# Patient Record
Sex: FEMALE | Race: WHITE | NOT HISPANIC OR LATINO | Employment: FULL TIME | URBAN - METROPOLITAN AREA
[De-identification: names, ages, dates, MRNs, and addresses within clinical notes are randomized per-mention and may not be internally consistent; named-entity substitution may affect disease eponyms.]

---

## 2020-10-14 ENCOUNTER — OFFICE VISIT (OUTPATIENT)
Dept: OCCUPATIONAL MEDICINE | Facility: CLINIC | Age: 30
End: 2020-10-14

## 2020-10-14 VITALS
HEIGHT: 61 IN | OXYGEN SATURATION: 98 % | DIASTOLIC BLOOD PRESSURE: 80 MMHG | HEART RATE: 81 BPM | WEIGHT: 204 LBS | BODY MASS INDEX: 38.51 KG/M2 | SYSTOLIC BLOOD PRESSURE: 122 MMHG | RESPIRATION RATE: 14 BRPM | TEMPERATURE: 97.1 F

## 2020-10-14 DIAGNOSIS — Z02.1 PRE-EMPLOYMENT HEALTH SCREENING EXAMINATION: ICD-10-CM

## 2020-10-14 PROCEDURE — 8915 PR COMPREHENSIVE PHYSICAL: Performed by: NURSE PRACTITIONER

## 2021-02-02 ENCOUNTER — HOSPITAL ENCOUNTER (OUTPATIENT)
Dept: RADIOLOGY | Facility: MEDICAL CENTER | Age: 31
End: 2021-02-02
Attending: PHYSICIAN ASSISTANT
Payer: COMMERCIAL

## 2021-02-02 ENCOUNTER — OFFICE VISIT (OUTPATIENT)
Dept: URGENT CARE | Facility: PHYSICIAN GROUP | Age: 31
End: 2021-02-02
Payer: COMMERCIAL

## 2021-02-02 ENCOUNTER — APPOINTMENT (OUTPATIENT)
Dept: RADIOLOGY | Facility: MEDICAL CENTER | Age: 31
End: 2021-02-02
Payer: OTHER GOVERNMENT

## 2021-02-02 ENCOUNTER — HOSPITAL ENCOUNTER (EMERGENCY)
Facility: MEDICAL CENTER | Age: 31
End: 2021-02-02
Attending: EMERGENCY MEDICINE
Payer: OTHER GOVERNMENT

## 2021-02-02 ENCOUNTER — APPOINTMENT (OUTPATIENT)
Dept: RADIOLOGY | Facility: MEDICAL CENTER | Age: 31
End: 2021-02-02
Attending: EMERGENCY MEDICINE
Payer: OTHER GOVERNMENT

## 2021-02-02 VITALS
OXYGEN SATURATION: 97 % | TEMPERATURE: 96.6 F | RESPIRATION RATE: 18 BRPM | WEIGHT: 190 LBS | SYSTOLIC BLOOD PRESSURE: 128 MMHG | BODY MASS INDEX: 35.87 KG/M2 | DIASTOLIC BLOOD PRESSURE: 92 MMHG | HEIGHT: 61 IN | HEART RATE: 78 BPM

## 2021-02-02 VITALS
DIASTOLIC BLOOD PRESSURE: 97 MMHG | WEIGHT: 190 LBS | RESPIRATION RATE: 18 BRPM | TEMPERATURE: 97.2 F | HEIGHT: 61 IN | BODY MASS INDEX: 35.87 KG/M2 | OXYGEN SATURATION: 96 % | SYSTOLIC BLOOD PRESSURE: 135 MMHG | HEART RATE: 72 BPM

## 2021-02-02 DIAGNOSIS — U07.1 COVID-19 VIRUS INFECTION: ICD-10-CM

## 2021-02-02 DIAGNOSIS — J98.01 BRONCHOSPASM, ACUTE: ICD-10-CM

## 2021-02-02 DIAGNOSIS — R06.00 DYSPNEA, UNSPECIFIED TYPE: ICD-10-CM

## 2021-02-02 DIAGNOSIS — R07.9 CHEST PAIN, UNSPECIFIED TYPE: ICD-10-CM

## 2021-02-02 DIAGNOSIS — Z86.16 HISTORY OF COVID-19: ICD-10-CM

## 2021-02-02 LAB
ALBUMIN SERPL BCP-MCNC: 4.1 G/DL (ref 3.2–4.9)
ALBUMIN/GLOB SERPL: 1.4 G/DL
ALP SERPL-CCNC: 87 U/L (ref 30–99)
ALT SERPL-CCNC: 340 U/L (ref 2–50)
ANION GAP SERPL CALC-SCNC: 10 MMOL/L (ref 7–16)
AST SERPL-CCNC: 189 U/L (ref 12–45)
BASOPHILS # BLD AUTO: 1.3 % (ref 0–1.8)
BASOPHILS # BLD: 0.11 K/UL (ref 0–0.12)
BILIRUB SERPL-MCNC: 0.5 MG/DL (ref 0.1–1.5)
BUN SERPL-MCNC: 10 MG/DL (ref 8–22)
CALCIUM SERPL-MCNC: 9.5 MG/DL (ref 8.5–10.5)
CHLORIDE SERPL-SCNC: 103 MMOL/L (ref 96–112)
CO2 SERPL-SCNC: 23 MMOL/L (ref 20–33)
CREAT SERPL-MCNC: 0.66 MG/DL (ref 0.5–1.4)
D DIMER PPP IA.FEU-MCNC: 0.28 UG/ML (FEU) (ref 0–0.5)
EKG IMPRESSION: NORMAL
EOSINOPHIL # BLD AUTO: 0.22 K/UL (ref 0–0.51)
EOSINOPHIL NFR BLD: 2.5 % (ref 0–6.9)
ERYTHROCYTE [DISTWIDTH] IN BLOOD BY AUTOMATED COUNT: 45.1 FL (ref 35.9–50)
GLOBULIN SER CALC-MCNC: 2.9 G/DL (ref 1.9–3.5)
GLUCOSE SERPL-MCNC: 94 MG/DL (ref 65–99)
HCG SERPL QL: NEGATIVE
HCT VFR BLD AUTO: 42.2 % (ref 37–47)
HGB BLD-MCNC: 13.9 G/DL (ref 12–16)
IMM GRANULOCYTES # BLD AUTO: 0.05 K/UL (ref 0–0.11)
IMM GRANULOCYTES NFR BLD AUTO: 0.6 % (ref 0–0.9)
LYMPHOCYTES # BLD AUTO: 2.98 K/UL (ref 1–4.8)
LYMPHOCYTES NFR BLD: 34.1 % (ref 22–41)
MCH RBC QN AUTO: 31 PG (ref 27–33)
MCHC RBC AUTO-ENTMCNC: 32.9 G/DL (ref 33.6–35)
MCV RBC AUTO: 94.2 FL (ref 81.4–97.8)
MONOCYTES # BLD AUTO: 0.59 K/UL (ref 0–0.85)
MONOCYTES NFR BLD AUTO: 6.7 % (ref 0–13.4)
NEUTROPHILS # BLD AUTO: 4.8 K/UL (ref 2–7.15)
NEUTROPHILS NFR BLD: 54.8 % (ref 44–72)
NRBC # BLD AUTO: 0 K/UL
NRBC BLD-RTO: 0 /100 WBC
PLATELET # BLD AUTO: 192 K/UL (ref 164–446)
PMV BLD AUTO: 11.3 FL (ref 9–12.9)
POTASSIUM SERPL-SCNC: 4.1 MMOL/L (ref 3.6–5.5)
PROT SERPL-MCNC: 7 G/DL (ref 6–8.2)
RBC # BLD AUTO: 4.48 M/UL (ref 4.2–5.4)
SODIUM SERPL-SCNC: 136 MMOL/L (ref 135–145)
TROPONIN T SERPL-MCNC: <6 NG/L (ref 6–19)
WBC # BLD AUTO: 8.8 K/UL (ref 4.8–10.8)

## 2021-02-02 PROCEDURE — 85379 FIBRIN DEGRADATION QUANT: CPT

## 2021-02-02 PROCEDURE — 80053 COMPREHEN METABOLIC PANEL: CPT

## 2021-02-02 PROCEDURE — 93005 ELECTROCARDIOGRAM TRACING: CPT

## 2021-02-02 PROCEDURE — 85025 COMPLETE CBC W/AUTO DIFF WBC: CPT

## 2021-02-02 PROCEDURE — 84703 CHORIONIC GONADOTROPIN ASSAY: CPT

## 2021-02-02 PROCEDURE — 93000 ELECTROCARDIOGRAM COMPLETE: CPT | Performed by: PHYSICIAN ASSISTANT

## 2021-02-02 PROCEDURE — 700117 HCHG RX CONTRAST REV CODE 255: Performed by: EMERGENCY MEDICINE

## 2021-02-02 PROCEDURE — 71046 X-RAY EXAM CHEST 2 VIEWS: CPT

## 2021-02-02 PROCEDURE — 93005 ELECTROCARDIOGRAM TRACING: CPT | Performed by: EMERGENCY MEDICINE

## 2021-02-02 PROCEDURE — 71275 CT ANGIOGRAPHY CHEST: CPT

## 2021-02-02 PROCEDURE — 99214 OFFICE O/P EST MOD 30 MIN: CPT | Mod: CS | Performed by: PHYSICIAN ASSISTANT

## 2021-02-02 PROCEDURE — 84484 ASSAY OF TROPONIN QUANT: CPT

## 2021-02-02 PROCEDURE — 99284 EMERGENCY DEPT VISIT MOD MDM: CPT

## 2021-02-02 RX ORDER — PREDNISONE 10 MG/1
40 TABLET ORAL DAILY
Qty: 20 TAB | Refills: 0 | Status: SHIPPED | OUTPATIENT
Start: 2021-02-02 | End: 2021-02-07

## 2021-02-02 RX ORDER — ALBUTEROL SULFATE 90 UG/1
2 AEROSOL, METERED RESPIRATORY (INHALATION) EVERY 6 HOURS PRN
Qty: 8.5 G | Refills: 0 | Status: SHIPPED | OUTPATIENT
Start: 2021-02-02

## 2021-02-02 RX ADMIN — IOHEXOL 45 ML: 350 INJECTION, SOLUTION INTRAVENOUS at 22:35

## 2021-02-02 ASSESSMENT — ENCOUNTER SYMPTOMS
SHORTNESS OF BREATH: 1
SORE THROAT: 0
FEVER: 0
EYE PAIN: 0
COUGH: 0
ORTHOPNEA: 0
CLAUDICATION: 0
VOMITING: 0
MYALGIAS: 0
CONSTIPATION: 0
DIARRHEA: 0
NAUSEA: 0
ABDOMINAL PAIN: 0
HEADACHES: 0
CHILLS: 0

## 2021-02-02 NOTE — PROGRESS NOTES
Subjective:   Cierra Collazo is a 30 y.o. female who presents for Shortness of Breath (x3days after having COVID)      HPI:  Is a 30-year-old female who had her first day of Covid symptoms January 16, 2021.  She describes a mild course of upper respiratory symptoms with cough cold congestion fatigue and body aches.  She quarantine for 10 days minus felt slow increase in her energy since then.  She returned to work around 3 to 4 days ago, restarted smoking around 3 to 4 days ago, and around 3 to 4 days ago began to experience worsening exertional dyspnea.  She notes pain with deep inspiration.  She denies any history of DVTs or coagulopathy, she is not on any hormone therapy, she has no significant family history of coagulopathy.  She denies any chest pain.  She has not noticed any leg pain or swelling    Review of Systems   Constitutional: Negative for chills and fever.   HENT: Negative for congestion, ear pain and sore throat.    Eyes: Negative for pain.   Respiratory: Positive for shortness of breath. Negative for cough.    Cardiovascular: Negative for chest pain, orthopnea, claudication and leg swelling.   Gastrointestinal: Negative for abdominal pain, constipation, diarrhea, nausea and vomiting.   Genitourinary: Negative for dysuria.   Musculoskeletal: Negative for myalgias.   Skin: Negative for rash.   Neurological: Negative for headaches.       Medications:    • This patient does not have an active medication from one of the medication groupers.    Allergies: Patient has no known allergies.    Problem List: Cierra Collazo does not have a problem list on file.    Surgical History:  No past surgical history on file.    Past Social Hx: Cierra Collazo  reports that she has been smoking cigarettes. She started smoking about 10 years ago. She quit smokeless tobacco use about 4 months ago. She reports previous alcohol use. She reports current drug use. Drug: Marijuana.     Past Family Hx:  Cierra Collazo family history  "is not on file.     Problem list, medications, and allergies reviewed by myself today in Epic.     Objective:     /92   Pulse 78   Temp 35.9 °C (96.6 °F) (Temporal)   Resp 18   Ht 1.549 m (5' 1\")   Wt 86.2 kg (190 lb)   SpO2 97%   BMI 35.90 kg/m²     Physical Exam  Vitals signs reviewed.   Constitutional:       Appearance: Normal appearance. She is not ill-appearing or toxic-appearing.   HENT:      Head: Normocephalic and atraumatic.      Right Ear: External ear normal.      Left Ear: External ear normal.      Nose: Nose normal.      Mouth/Throat:      Mouth: Mucous membranes are moist.   Eyes:      Conjunctiva/sclera: Conjunctivae normal.   Cardiovascular:      Rate and Rhythm: Normal rate and regular rhythm.   Pulmonary:      Effort: Pulmonary effort is normal.      Comments: Mildly diminished breath sounds in all fields, no wheezes or other adventitious sounds heard  Musculoskeletal:      Right lower leg: No edema.      Left lower leg: No edema.   Skin:     General: Skin is warm and dry.      Capillary Refill: Capillary refill takes less than 2 seconds.   Neurological:      Mental Status: She is alert and oriented to person, place, and time.         RADIOLOGY RESULTS   Dx-chest-2 Views    Result Date: 2/2/2021 2/2/2021 1:39 PM HISTORY/REASON FOR EXAM:  Dyspnea for 3 days with intermittent cough. History of Covid positive mid January 2021. TECHNIQUE/EXAM DESCRIPTION AND NUMBER OF VIEWS: Two views of the chest. COMPARISON:  None available. FINDINGS: The mediastinal and cardiac silhouette is unremarkable. The pulmonary vascularity is within normal limits. The lung parenchyma is clear. There is no significant pleural effusion. There is no visible pneumothorax. There are no acute bony abnormalities.     1.  Unremarkable two view chest.           EKG interpreted by me at 1405 sinus rhythm with rate of 74 normal intervals normal axis.  T waves in V1 through V3 as well as inferior lead III concerning for " right heart strain.  No previous for comparison.  Assessment/Plan:     Diagnosis and associated orders:     1. History of COVID-19  DX-CHEST-2 VIEWS    EKG - Clinic Performed   2. Dyspnea, unspecified type  DX-CHEST-2 VIEWS    EKG - Clinic Performed      Comments/MDM:     • Her EKG as above is concerning for right heart strain which would is what the conduction would show with a pulmonary embolus causing strain on the right heart.  Given her hypercoagulable state status post Covid infection as well as is her exertional dyspnea very concerned about a pulmonary embolus.  I sent her to the ER for emergent evaluation and management, I offered an ambulance ride however her mom drove her here and will take her to the ER.  She was unsure whether she was in ago to an outside hospital so I provided her with a copy of her EKG.  We have no previous for [place].  This could be related to restarting tobacco and she could be suffering from some bronchospasm as a note diminished breath sounds.  I made her promise that she will go to the ER, and I told her if she went to the ER then I would send prednisone as well as albuterol to her pharmacy as this may be a good medical option once VTE has been ruled out         Differential diagnosis, natural history, supportive care, and indications for immediate follow-up discussed.    Advised the patient to follow-up with the primary care physician for recheck, reevaluation, and consideration of further management.    Please note that this dictation was created using voice recognition software. I have made a reasonable attempt to correct obvious errors, but I expect that there are errors of grammar and possibly content that I did not discover before finalizing the note.    This note was electronically signed by Blake Moore PA-C

## 2021-02-03 NOTE — DISCHARGE INSTRUCTIONS
Rest, increase fluids, ibuprofen as needed for pain  Follow-up with your primary care provider within the week for recheck.

## 2021-02-03 NOTE — ED TRIAGE NOTES
"Chief Complaint   Patient presents with   • Sent from Urgent Care     \"possible PE\"   • Shortness of Breath     x3 days   • Other     covid + 1/16/2021   • Generalized Body Aches   • Chest Pain     R sided chest pain, worse with deep inspiration.     Patient to triage via ambuation, with a steady gait, patient A&O x4.  Appropriate precautions in place.     Explained wait time and triage process to pt. Pt placed back in SENIOR lobby, told to notify ED tech or triage RN of any changes, verbalized understanding.    "

## 2021-02-03 NOTE — ED PROVIDER NOTES
"ED Provider Note     Scribed for Jessica Haro D.O. by Yariel Richey. 2/2/2021, 7:22 PM.     Primary care provider: Pcp Pt States None  Means of arrival: Walk in         History obtained from: Patient  History limited by: None    CHIEF COMPLAINT  Chief Complaint   Patient presents with   • Sent from Urgent Care     \"possible PE\"   • Shortness of Breath     x3 days   • Other     covid + 1/16/2021   • Generalized Body Aches   • Chest Pain     R sided chest pain, worse with deep inspiration.       HPI  Cierra Collazo is a 30 y.o. female who presents to the emergency Department for shortness of breath and chest pain. She states that her shortness of breath began 3 days ago. The patient states that her chest pain occurs when she takes a deep breath. She was seen earlier at a [facility] urgent care and was told to come into the ED over concern of PE due to an abnormal EKG. Patient was diagnosed with Covid on 1/16/21. Patient smokes half a pack of cigarettes a day. She is not on birth control. Patient reports bilateral calf tenderness, but denies any fever or coughing up blood. No history of cancer or diabetes. No allergies to medications. No exacerbating or alleviating factors.     PPE Note: I personally donned full PPE for all patient encounters during this visit, including an N95 respirator mask and gloves     Scribe remained outside the patient's room and did not have any contact with the patient for the duration of patient encounter.      REVIEW OF SYSTEMS  Pertinent positives include shortness of breath, chest pain, and bilateral calf tenderness. Pertinent negatives include no fever or coughing up blood.   See HPI for further details. All other systems are negative.    PAST MEDICAL HISTORY  History reviewed. No pertinent past medical history.    FAMILY HISTORY  History reviewed. No pertinent family history.    SOCIAL HISTORY  Social History     Tobacco Use   • Smoking status: Current Every Day Smoker     " "Packs/day: 0.25     Years: 5.00     Pack years: 1.25     Types: Cigarettes     Start date: 10/14/2010     Last attempt to quit: 10/7/2020     Years since quittin.3   • Smokeless tobacco: Former User     Quit date: 2020   Substance Use Topics   • Alcohol use: Not Currently   • Drug use: Yes     Types: Marijuana, Inhaled     Comment: occ marijuana      Social History     Substance and Sexual Activity   Drug Use Yes   • Types: Marijuana, Inhaled    Comment: occ marijuana       SURGICAL HISTORY  History reviewed. No pertinent surgical history.    CURRENT MEDICATIONS  No current facility-administered medications for this encounter.     Current Outpatient Medications:   •  albuterol 108 (90 Base) MCG/ACT Aero Soln inhalation aerosol, Inhale 2 Puffs every 6 hours as needed for Shortness of Breath., Disp: 8.5 g, Rfl: 0  •  predniSONE (DELTASONE) 10 MG Tab, Take 4 Tabs by mouth every day for 5 days., Disp: 20 Tab, Rfl: 0    ALLERGIES  No Known Allergies    PHYSICAL EXAM  VITAL SIGNS: /97   Pulse 86   Temp 36.2 °C (97.2 °F) (Temporal)   Resp 18   Ht 1.549 m (5' 1\")   Wt 86.2 kg (190 lb)   LMP 2021 (Approximate)   SpO2 99%   Breastfeeding No   BMI 35.90 kg/m²     Constitutional: Patient is well developed, well nourished.  Mildly anxious.  HENT: Normocephalic, atraumatic Nose normal with no mucosal edema or drainage.  Oral mucosa moist.  Eyes: PERRL, EOMI   Neck: Supple  Normal range of motion in flexion, extension and lateral rotation. No tenderness along the bony prominences   Cardiovascular: Normal heart rate and Regular rhythm. No murmur  Thorax & Lungs: Clear and equal breath sounds with good excursion. No respiratory distress, no rhonchi, no wheezing.  Pain in the right side of the chest worsens with deep inspiration.  There is no rashes or palpable deformities.  Abdomen: Bowel sounds normal in all four quadrants. Soft,nontender   Skin: Warm, Dry, No erythema, No rashes.   Back: No cervical, " thoracic, or lumbosacral tenderness.    Extremities: Mild bilateral calf tenderness. Peripheral pulses 4/4 No edema, No tenderness  Musculoskeletal: Normal range of motion in all major joints.  Neurologic: Alert & oriented x 3, Normal motor function, Normal sensory function  Psychiatric: Affect normal, Judgment normal, Mood normal.     DIAGNOSTICS/PROCEDURES    LABS  Results for orders placed or performed during the hospital encounter of 02/02/21   CBC with Differential   Result Value Ref Range    WBC 8.8 4.8 - 10.8 K/uL    RBC 4.48 4.20 - 5.40 M/uL    Hemoglobin 13.9 12.0 - 16.0 g/dL    Hematocrit 42.2 37.0 - 47.0 %    MCV 94.2 81.4 - 97.8 fL    MCH 31.0 27.0 - 33.0 pg    MCHC 32.9 (L) 33.6 - 35.0 g/dL    RDW 45.1 35.9 - 50.0 fL    Platelet Count 192 164 - 446 K/uL    MPV 11.3 9.0 - 12.9 fL    Neutrophils-Polys 54.80 44.00 - 72.00 %    Lymphocytes 34.10 22.00 - 41.00 %    Monocytes 6.70 0.00 - 13.40 %    Eosinophils 2.50 0.00 - 6.90 %    Basophils 1.30 0.00 - 1.80 %    Immature Granulocytes 0.60 0.00 - 0.90 %    Nucleated RBC 0.00 /100 WBC    Neutrophils (Absolute) 4.80 2.00 - 7.15 K/uL    Lymphs (Absolute) 2.98 1.00 - 4.80 K/uL    Monos (Absolute) 0.59 0.00 - 0.85 K/uL    Eos (Absolute) 0.22 0.00 - 0.51 K/uL    Baso (Absolute) 0.11 0.00 - 0.12 K/uL    Immature Granulocytes (abs) 0.05 0.00 - 0.11 K/uL    NRBC (Absolute) 0.00 K/uL   Complete Metabolic Panel (CMP)   Result Value Ref Range    Sodium 136 135 - 145 mmol/L    Potassium 4.1 3.6 - 5.5 mmol/L    Chloride 103 96 - 112 mmol/L    Co2 23 20 - 33 mmol/L    Anion Gap 10.0 7.0 - 16.0    Glucose 94 65 - 99 mg/dL    Bun 10 8 - 22 mg/dL    Creatinine 0.66 0.50 - 1.40 mg/dL    Calcium 9.5 8.5 - 10.5 mg/dL    AST(SGOT) 189 (H) 12 - 45 U/L    ALT(SGPT) 340 (H) 2 - 50 U/L    Alkaline Phosphatase 87 30 - 99 U/L    Total Bilirubin 0.5 0.1 - 1.5 mg/dL    Albumin 4.1 3.2 - 4.9 g/dL    Total Protein 7.0 6.0 - 8.2 g/dL    Globulin 2.9 1.9 - 3.5 g/dL    A-G Ratio 1.4 g/dL    Troponin   Result Value Ref Range    Troponin T <6 6 - 19 ng/L   ESTIMATED GFR   Result Value Ref Range    GFR If African American >60 >60 mL/min/1.73 m 2    GFR If Non African American >60 >60 mL/min/1.73 m 2   D-DIMER   Result Value Ref Range    D-Dimer Screen 0.28 0.00 - 0.50 ug/mL (FEU)   BETA-HCG QUALITATIVE SERUM   Result Value Ref Range    Beta-Hcg Qualitative Serum Negative Negative   EKG   Result Value Ref Range    Report       [facility] Emergency Dept.    Test Date:  2021  Pt Name:    FEDERICO LIRIANO                Department: ER  MRN:        [id number]                      Room:  Gender:     Female                       Technician: [id number]  :        1990                   Requested By:ER TRIAGE PROTOCOL  Order #:    [id number]                    Reading MD:    Measurements  Intervals                                Axis  Rate:       77                           P:          48  MO:         128                          QRS:        42  QRSD:       84                           T:          -35  QT:         408  QTc:        462    Interpretive Statements  SINUS RHYTHM  ABNORMAL T, CONSIDER ISCHEMIA, DIFFUSE LEADS  No previous ECG available for comparison        Labs reviewed by me    EKG  Results for orders placed or performed during the hospital encounter of 21   EKG   Result Value Ref Range    Report       [facility] Emergency Dept.    Test Date:  2021  Pt Name:    FEDERICO LIRIANO                Department: ER  MRN:        [id number]                      Room:  Gender:     Female                       Technician: [id number]  :        1990                   Requested By:ER TRIAGE PROTOCOL  Order #:    [id number]                    Reading MD:    Measurements  Intervals                                Axis  Rate:       77                           P:          48  MO:         128                          QRS:        42  QRSD:       84                            T:          -35  QT:         408  QTc:        462    Interpretive Statements  SINUS RHYTHM  ABNORMAL T, CONSIDER ISCHEMIA, DIFFUSE LEADS  No previous ECG available for comparison          RADIOLOGY/PROCEDURES  CT-CTA CHEST PULMONARY ARTERY W/ RECONS   Final Result         No evidence of pulmonary embolism.      Hepatic steatosis.      A few tiny nonspecific micronodules and tiny groundglass density foci are nonspecific, possibly infectious or postinflammatory. Follow-up as clinically indicated.           Results and radiologist interpretation reviewed by me.     COURSE & MEDICAL DECISION MAKING  Pertinent Labs & Imaging studies reviewed. (See chart for details)    7:22 PM - Patient seen and evaluated at bedside. Ordered for EKG, troponin, CMP, CBC with differential, estimated GFR, d-dimer, and ct-cta to evaluate. Differential diagnoses include, but are not limited to, Covid pneumonia, pulmonary embolus  Patient's labs were unremarkable.  Twelve-lead EKG shows normal sinus rhythm at a rate of 77 bpm with no acute ST elevation or depression.  Liver enzymes are slightly elevated with an SGOT of 189 and SGPT of 340.  Because of this is unknown if she does not have abdominal pain at this time.  CT chest to rule out pulmonary embolus was negative other than hepatic steatosis.  There is some groundglass changes consistent with her recent Covid infection.    11:36 PM Patient was reevaluated at beside. Updated on labs and imaging as seen above.  Patient was reassured that she will be fine.  She is to rest, increase fluids, Tylenol or ibuprofen for pain.  Discussed plan to discharge at this time. Patient verbally understands and agrees to plan of care.      The patient will return for new or worsening symptoms and is stable at the time of discharge.        DISPOSITION:  Patient will be discharged home in stable condition.    FOLLOW UP:  [facility] [address]  [address], [address]  [place]  [id number]  [phone]  Schedule an appointment as soon as possible for a visit in 1 week  As needed, If symptoms worsen      OUTPATIENT MEDICATIONS:  New Prescriptions    No medications on file        FINAL IMPRESSION  1. Chest pain, unspecified type    2. COVID-19 virus infection         Yariel STRAUSS (Scribe), am scribing for, and in the presence of, Jessica Haro D.O..    Electronically signed by: Yariel Richey (Keiryibnain), 2/2/2021    IJessica D.O. personally performed the services described in this documentation, as scribed by Yariel Richey in my presence, and it is both accurate and complete.  C  The note accurately reflects work and decisions made by me.  Jessica Haro D.O.  2/3/2021  2:59 AM

## 2021-09-27 ENCOUNTER — APPOINTMENT (OUTPATIENT)
Dept: URGENT CARE | Facility: PHYSICIAN GROUP | Age: 31
End: 2021-09-27
Payer: COMMERCIAL

## 2022-05-23 ENCOUNTER — HOSPITAL ENCOUNTER (OUTPATIENT)
Facility: MEDICAL CENTER | Age: 32
End: 2022-05-23
Attending: NURSE PRACTITIONER
Payer: COMMERCIAL

## 2022-05-23 ENCOUNTER — APPOINTMENT (OUTPATIENT)
Dept: URGENT CARE | Facility: CLINIC | Age: 32
End: 2022-05-23
Payer: COMMERCIAL

## 2022-05-23 ENCOUNTER — OFFICE VISIT (OUTPATIENT)
Dept: URGENT CARE | Facility: PHYSICIAN GROUP | Age: 32
End: 2022-05-23

## 2022-05-23 VITALS
SYSTOLIC BLOOD PRESSURE: 138 MMHG | BODY MASS INDEX: 39.27 KG/M2 | WEIGHT: 200 LBS | HEIGHT: 60 IN | OXYGEN SATURATION: 96 % | TEMPERATURE: 96.8 F | HEART RATE: 100 BPM | DIASTOLIC BLOOD PRESSURE: 80 MMHG | RESPIRATION RATE: 16 BRPM

## 2022-05-23 DIAGNOSIS — Z20.822 SUSPECTED COVID-19 VIRUS INFECTION: ICD-10-CM

## 2022-05-23 DIAGNOSIS — R09.81 NASAL CONGESTION: ICD-10-CM

## 2022-05-23 PROCEDURE — 99213 OFFICE O/P EST LOW 20 MIN: CPT | Mod: CS | Performed by: NURSE PRACTITIONER

## 2022-05-23 PROCEDURE — 0240U HCHG SARS-COV-2 COVID-19 NFCT DS RESP RNA 3 TRGT MIC: CPT

## 2022-05-23 RX ORDER — FLUTICASONE PROPIONATE 50 MCG
1 SPRAY, SUSPENSION (ML) NASAL DAILY
Qty: 16 G | Refills: 0 | Status: SHIPPED | OUTPATIENT
Start: 2022-05-23 | End: 2022-06-22

## 2022-05-23 ASSESSMENT — ENCOUNTER SYMPTOMS
DIARRHEA: 0
ABDOMINAL PAIN: 0
MYALGIAS: 0
SINUS PAIN: 1
HEADACHES: 1
SORE THROAT: 1
VOMITING: 0
HOARSE VOICE: 1
SPUTUM PRODUCTION: 0
NAUSEA: 0
WHEEZING: 0
COUGH: 1
SHORTNESS OF BREATH: 1
HEMOPTYSIS: 0
CHILLS: 1

## 2022-05-23 ASSESSMENT — FIBROSIS 4 INDEX: FIB4 SCORE: 1.76

## 2022-05-24 DIAGNOSIS — Z20.822 SUSPECTED COVID-19 VIRUS INFECTION: ICD-10-CM

## 2022-05-24 LAB
FLUAV RNA SPEC QL NAA+PROBE: NEGATIVE
FLUBV RNA SPEC QL NAA+PROBE: NEGATIVE
SARS-COV-2 RNA RESP QL NAA+PROBE: NOTDETECTED
SPECIMEN SOURCE: NORMAL

## 2022-05-24 NOTE — PROGRESS NOTES
Subjective:     Cierra Collazo is a 31 y.o. female who presents for Sinusitis (X 1 week with headache, runny nose, congestion )      Sinusitis  This is a new problem. The current episode started in the past 7 days (7 days ago Cierra, who is a pleasant 31 year old female developed allergy sympomts that have progressively worsened to include congestion, sinus pain PND and headache). The problem has been gradually worsening since onset. Maximum temperature: Tactile. Her pain is at a severity of 5/10. Associated symptoms include chills, congestion, coughing, headaches, a hoarse voice, shortness of breath and a sore throat. Pertinent negatives include no ear pain. Treatments tried: Benadryl, Zyrtec Motrin. The treatment provided mild relief.         Review of Systems   Constitutional: Positive for chills and malaise/fatigue.   HENT: Positive for congestion, hoarse voice, sinus pain and sore throat. Negative for ear pain.    Respiratory: Positive for cough and shortness of breath. Negative for hemoptysis, sputum production and wheezing.    Gastrointestinal: Negative for abdominal pain, diarrhea, nausea and vomiting.   Musculoskeletal: Negative for myalgias.   Neurological: Positive for headaches.       PMH: History reviewed. No pertinent past medical history.  ALLERGIES: No Known Allergies  SURGHX: History reviewed. No pertinent surgical history.  SOCHX:   Social History     Socioeconomic History   • Marital status: Single   Tobacco Use   • Smoking status: Current Every Day Smoker     Packs/day: 0.25     Years: 5.00     Pack years: 1.25     Types: Cigarettes     Start date: 10/14/2010     Last attempt to quit: 10/7/2020     Years since quittin.6   • Smokeless tobacco: Former User     Quit date: 2020   Vaping Use   • Vaping Use: Every day   • Substances: Nicotine   • Devices: Refillable tank   Substance and Sexual Activity   • Alcohol use: Not Currently   • Drug use: Yes     Types: Marijuana, Inhaled      Comment: occ marijuana     FH: History reviewed. No pertinent family history.      Objective:   /80 (BP Location: Left arm, Patient Position: Sitting, BP Cuff Size: Large adult)   Pulse 100   Temp 36 °C (96.8 °F) (Temporal)   Resp 16   Ht 1.524 m (5')   Wt 90.7 kg (200 lb)   SpO2 96%   BMI 39.06 kg/m²     Physical Exam  Vitals and nursing note reviewed.   Constitutional:       General: She is not in acute distress.     Appearance: Normal appearance. She is ill-appearing.   HENT:      Head: Normocephalic and atraumatic.      Right Ear: Tympanic membrane, ear canal and external ear normal. There is no impacted cerumen.      Left Ear: Tympanic membrane, ear canal and external ear normal. There is no impacted cerumen.      Nose: Congestion and rhinorrhea present.      Right Turbinates: Enlarged and swollen.      Left Turbinates: Enlarged and swollen.      Right Sinus: Maxillary sinus tenderness and frontal sinus tenderness present.      Left Sinus: Maxillary sinus tenderness and frontal sinus tenderness present.      Mouth/Throat:      Mouth: Mucous membranes are moist.      Pharynx: Uvula midline. Pharyngeal swelling and posterior oropharyngeal erythema present. No oropharyngeal exudate or uvula swelling.      Tonsils: No tonsillar exudate or tonsillar abscesses. 1+ on the right. 1+ on the left.      Comments: Postnasal drip present  Eyes:      Extraocular Movements: Extraocular movements intact.      Pupils: Pupils are equal, round, and reactive to light.   Cardiovascular:      Rate and Rhythm: Normal rate and regular rhythm.      Pulses: Normal pulses.      Heart sounds: Normal heart sounds.   Pulmonary:      Effort: Pulmonary effort is normal.      Breath sounds: Normal breath sounds.   Abdominal:      General: Abdomen is flat. Bowel sounds are normal.      Palpations: Abdomen is soft.      Tenderness: There is no abdominal tenderness. There is no right CVA tenderness or left CVA tenderness.    Musculoskeletal:         General: Normal range of motion.      Cervical back: Normal range of motion and neck supple. No tenderness.   Lymphadenopathy:      Cervical: No cervical adenopathy.   Skin:     General: Skin is warm and dry.      Capillary Refill: Capillary refill takes less than 2 seconds.   Neurological:      General: No focal deficit present.      Mental Status: She is alert and oriented to person, place, and time. Mental status is at baseline.   Psychiatric:         Mood and Affect: Mood normal.         Behavior: Behavior normal.         Thought Content: Thought content normal.         Judgment: Judgment normal.         Assessment/Plan:   Assessment    1. Suspected COVID-19 virus infection  CoV-2 and Flu A/B by PCR (24 hour In-House): Collect NP swab in VTM   2. Nasal congestion  fluticasone (FLONASE) 50 MCG/ACT nasal spray   PCR COVID/flu pending.  I will notify her of results.  We discussed supportive measures including humidifier, warm salt water gargles, over-the-counter Cepacol throat lozenges, rest  and increased fluids. Pt was encouraged to seek treatment back in the ER or urgent care for worsening symptoms,  fever greater than 100.5, wheezes or shortness of breath.    AVS handout given and reviewed with patient. Pt educated on red flags and when to seek treatment back in ER or UC.

## 2022-05-27 DIAGNOSIS — J01.90 ACUTE BACTERIAL SINUSITIS: ICD-10-CM

## 2022-05-27 DIAGNOSIS — B96.89 ACUTE BACTERIAL SINUSITIS: ICD-10-CM

## 2022-05-27 RX ORDER — DOXYCYCLINE HYCLATE 100 MG
100 TABLET ORAL 2 TIMES DAILY
Qty: 14 TABLET | Refills: 0 | Status: SHIPPED | OUTPATIENT
Start: 2022-05-27 | End: 2022-06-03

## 2022-09-16 ENCOUNTER — APPOINTMENT (OUTPATIENT)
Dept: URGENT CARE | Facility: CLINIC | Age: 32
End: 2022-09-16
Payer: COMMERCIAL

## 2022-09-28 ENCOUNTER — APPOINTMENT (OUTPATIENT)
Dept: URGENT CARE | Facility: PHYSICIAN GROUP | Age: 32
End: 2022-09-28

## 2023-02-25 ENCOUNTER — APPOINTMENT (OUTPATIENT)
Dept: RADIOLOGY | Facility: MEDICAL CENTER | Age: 33
End: 2023-02-25
Attending: EMERGENCY MEDICINE
Payer: COMMERCIAL

## 2023-02-25 ENCOUNTER — HOSPITAL ENCOUNTER (EMERGENCY)
Facility: MEDICAL CENTER | Age: 33
End: 2023-02-25
Attending: EMERGENCY MEDICINE
Payer: COMMERCIAL

## 2023-02-25 VITALS
BODY MASS INDEX: 41.23 KG/M2 | OXYGEN SATURATION: 96 % | RESPIRATION RATE: 15 BRPM | DIASTOLIC BLOOD PRESSURE: 68 MMHG | HEIGHT: 60 IN | WEIGHT: 210 LBS | TEMPERATURE: 97 F | SYSTOLIC BLOOD PRESSURE: 118 MMHG | HEART RATE: 80 BPM

## 2023-02-25 DIAGNOSIS — F10.920 ALCOHOLIC INTOXICATION WITHOUT COMPLICATION (HCC): ICD-10-CM

## 2023-02-25 DIAGNOSIS — S09.90XA CLOSED HEAD INJURY, INITIAL ENCOUNTER: ICD-10-CM

## 2023-02-25 DIAGNOSIS — S01.01XA LACERATION OF SCALP, INITIAL ENCOUNTER: ICD-10-CM

## 2023-02-25 PROCEDURE — 700111 HCHG RX REV CODE 636 W/ 250 OVERRIDE (IP): Performed by: EMERGENCY MEDICINE

## 2023-02-25 PROCEDURE — 304999 HCHG REPAIR-SIMPLE/INTERMED LEVEL 1

## 2023-02-25 PROCEDURE — 70450 CT HEAD/BRAIN W/O DYE: CPT

## 2023-02-25 PROCEDURE — 90715 TDAP VACCINE 7 YRS/> IM: CPT | Performed by: EMERGENCY MEDICINE

## 2023-02-25 PROCEDURE — 305308 HCHG STAPLER,SKIN,DISP.

## 2023-02-25 PROCEDURE — 304217 HCHG IRRIGATION SYSTEM

## 2023-02-25 PROCEDURE — 700101 HCHG RX REV CODE 250: Performed by: EMERGENCY MEDICINE

## 2023-02-25 PROCEDURE — 72125 CT NECK SPINE W/O DYE: CPT

## 2023-02-25 PROCEDURE — 90471 IMMUNIZATION ADMIN: CPT

## 2023-02-25 PROCEDURE — 99285 EMERGENCY DEPT VISIT HI MDM: CPT

## 2023-02-25 PROCEDURE — 96374 THER/PROPH/DIAG INJ IV PUSH: CPT

## 2023-02-25 RX ORDER — LIDOCAINE HYDROCHLORIDE AND EPINEPHRINE BITARTRATE 20; .01 MG/ML; MG/ML
10 INJECTION, SOLUTION SUBCUTANEOUS ONCE
Status: COMPLETED | OUTPATIENT
Start: 2023-02-25 | End: 2023-02-25

## 2023-02-25 RX ADMIN — FENTANYL CITRATE 50 MCG: 50 INJECTION, SOLUTION INTRAMUSCULAR; INTRAVENOUS at 06:30

## 2023-02-25 RX ADMIN — CLOSTRIDIUM TETANI TOXOID ANTIGEN (FORMALDEHYDE INACTIVATED), CORYNEBACTERIUM DIPHTHERIAE TOXOID ANTIGEN (FORMALDEHYDE INACTIVATED), BORDETELLA PERTUSSIS TOXOID ANTIGEN (GLUTARALDEHYDE INACTIVATED), BORDETELLA PERTUSSIS FILAMENTOUS HEMAGGLUTININ ANTIGEN (FORMALDEHYDE INACTIVATED), BORDETELLA PERTUSSIS PERTACTIN ANTIGEN, AND BORDETELLA PERTUSSIS FIMBRIAE 2/3 ANTIGEN 0.5 ML: 5; 2; 2.5; 5; 3; 5 INJECTION, SUSPENSION INTRAMUSCULAR at 08:03

## 2023-02-25 RX ADMIN — LIDOCAINE HYDROCHLORIDE AND EPINEPHRINE 10 ML: 20; 10 INJECTION, SOLUTION INFILTRATION; PERINEURAL at 07:58

## 2023-02-25 NOTE — Clinical Note
Cierra Collazo was seen and treated in our emergency department on 2/25/2023.  She may return to work on 02/26/2023.       If you have any questions or concerns, please don't hesitate to call.      Dwaine Yepez M.D.

## 2023-02-25 NOTE — ED PROVIDER NOTES
ED Provider Note    CHIEF COMPLAINT  Chief Complaint   Patient presents with    Head Injury     Pt fell in shower hitting the back of her head, bleeding controlled. Pt denies LOC or thinners. ETOH involved. Pt c/o dizziness, nausea.          HPI/ROS  LIMITATION TO HISTORY   Patient moderately intoxicated      Cierra Collazo is a 32 y.o. female who presents after falling and striking her head in the shower.  Patient denies any associated loss of consciousness. Patient denies any use of antiplatelets or anticoagulants.  She has a history of hypertension.  Patient recalls entire event, no seizure following.  Patient does report significant nausea but has not vomited.  She reports she did drink heavily last night and had not gone to bed yet, she jumped in the shower and while getting out tripped, fell striking her head on a metal shower dish.  Patient is unsure when her last tetanus was updated.  Patient denies any associated back pain.  She does report some mild neck pain.  Patient denies any extremity pain.    PAST MEDICAL HISTORY   has a past medical history of Hypertension and Palpitations.    SURGICAL HISTORY  patient denies any surgical history    FAMILY HISTORY  No family history on file.    SOCIAL HISTORY  Social History     Tobacco Use    Smoking status: Every Day     Packs/day: 0.25     Years: 5.00     Pack years: 1.25     Types: Cigarettes     Start date: 10/14/2010     Last attempt to quit: 10/7/2020     Years since quittin.3    Smokeless tobacco: Former     Quit date: 2020   Vaping Use    Vaping Use: Former    Devices: [employer] tank   Substance and Sexual Activity    Alcohol use: Yes    Drug use: Yes     Types: Marijuana, Inhaled     Comment: occ marijuana    Sexual activity: Not on file       CURRENT MEDICATIONS  Home Medications    **Home medications have not yet been reviewed for this encounter**         ALLERGIES  No Known Allergies    PHYSICAL EXAM  VITAL SIGNS: /80   Pulse  83   Resp 18   Ht 1.524 m (5')   Wt 95.3 kg (210 lb)   SpO2 96%   BMI 41.01 kg/m²    Physical Exam  Constitutional:       Appearance: She is well-developed.   HENT:      Head:      Comments: 2 cm laceration on the left parietal lobe with large underlying cephalhematoma with considerable tenderness in this area.  No johnson sign, no raccoon eye     Right Ear: Tympanic membrane normal.      Left Ear: Tympanic membrane normal.   Eyes:      Pupils: Pupils are equal, round, and reactive to light.   Cardiovascular:      Rate and Rhythm: Normal rate and regular rhythm.   Pulmonary:      Effort: Pulmonary effort is normal. No accessory muscle usage or respiratory distress.      Breath sounds: Normal breath sounds.   Abdominal:      General: Bowel sounds are normal.      Palpations: Abdomen is soft. There is no mass.      Tenderness: There is no abdominal tenderness.   Musculoskeletal:         General: Normal range of motion.      Comments: Mild paraspinal tenderness of the cervical spine without any midline tenderness.  No tenderness of thoracic or lumbar spine.  Able to two-point discriminate   Skin:     General: Skin is warm.      Capillary Refill: Capillary refill takes less than 2 seconds.   Neurological:      General: No focal deficit present.      Mental Status: She is alert.   Psychiatric:         Mood and Affect: Mood normal. Mood is not anxious.         DIAGNOSTIC STUDIES / PROCEDURES    LABS  Results for orders placed or performed during the hospital encounter of 05/23/22   CoV-2 and Flu A/B by PCR (24 hour In-House): Collect NP swab in VTM    Specimen: Nasopharyngeal; Respirate   Result Value Ref Range    Influenza virus A RNA Negative Negative    Influenza virus B, PCR Negative Negative    SARS-CoV-2 by PCR NotDetected     SARS-CoV-2 Source Nasal Swab          RADIOLOGY  I have independently interpreted the diagnostic imaging associated with this visit and am waiting the final reading from the radiologist.    My preliminary interpretation is a follows: Unremarkable for ICH or fx  Radiologist interpretation:   CT-CSPINE WITHOUT PLUS RECONS   Final Result         1. No acute fracture from C1 through T1 is visualized.         CT-HEAD W/O   Final Result         1. No acute intracranial abnormality. No evidence of acute intracranial hemorrhage or mass lesion.                         COURSE & MEDICAL DECISION MAKING    ED Observation Status? Yes; I am placing the patient in to an observation status due to a diagnostic uncertainty as well as therapeutic intensity. Patient placed in observation status at 6:29 AM, 2/25/2023.     Observation plan is as follows: Continue to reassess for clinical sobriety, rapidly rule out possible traumatic ICH or traumatic cervical spine fracture    Upon Reevaluation, the patient's condition has: Improved; and will be discharged.    Patient discharged from ED Observation status at 8:30 AM (Time) 02/25/23   (Date).     Laceration Repair Procedure    Indication: Laceration    Location/Description: see above    Procedure: The patient was placed in the appropriate position and anesthesia around the laceration was obtained by infiltration using 2% Lidocaine with epinephrine. The area was then irrigated with high pressure normal saline. The laceration was closed with staples. There were no additional lacerations requiring repair.     Total repaired wound length: 2 cm.     Other Items: Staple count: 2    The patient tolerated the procedure well.    Complications: None      INITIAL ASSESSMENT, COURSE AND PLAN  Care Narrative: Patient here after slip and fall in the shower.  She does have significant tenderness of a very large cephalhematoma overlying her left parietal lobe.  She is very nauseous but has not vomited.  Unfortunately I am unable to use either [place] head CT or [place] head CT rules this patient is significantly intoxicated.  I am also unable to use Nexus criteria given patient's  intoxication.  Given this in order to sufficiently and expeditiously rule out any possible dangerous etiology we will CT patient's head and C-spine.  We will continue to reassess.  Patient given fentanyl for pain.  Will laceration.  Tdap updated.  Patient laceration repaired as above.  Patient tolerated well.    CT is unremarkable.    Patient is now clinically sober, she is ambulatory without issue, she has no other new complaints, repeat exam fails to reveal any new areas of tenderness      DISPOSITION AND DISCUSSIONS    Decision tools and prescription drugs considered including, but not limited to: [place] head CT rule, Nexus C-spine rules.    FINAL DIAGNOSIS  1. Laceration of scalp, initial encounter    2. Closed head injury, initial encounter    3. Alcoholic intoxication without complication (HCC)

## 2023-02-25 NOTE — ED NOTES
Staple closure complete by ERP.  Pt cleaned up. Clean shirt provided. Pt calling family for a ride home.

## 2023-02-25 NOTE — DISCHARGE INSTRUCTIONS
You may have sustained a concussion, your CT is normal therefore you can sleep if you need to without interruption.  You may have a mild headache and some nausea over the next few days which is normal with concussion.